# Patient Record
Sex: MALE | Race: WHITE
[De-identification: names, ages, dates, MRNs, and addresses within clinical notes are randomized per-mention and may not be internally consistent; named-entity substitution may affect disease eponyms.]

---
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
## 2019-02-02 ENCOUNTER — HOSPITAL ENCOUNTER (EMERGENCY)
Dept: HOSPITAL 62 - ER | Age: 19
Discharge: HOME | End: 2019-02-02
Payer: SELF-PAY

## 2019-02-02 VITALS — DIASTOLIC BLOOD PRESSURE: 53 MMHG | SYSTOLIC BLOOD PRESSURE: 131 MMHG

## 2019-02-02 DIAGNOSIS — Z88.1: ICD-10-CM

## 2019-02-02 DIAGNOSIS — M79.10: ICD-10-CM

## 2019-02-02 DIAGNOSIS — R00.0: ICD-10-CM

## 2019-02-02 DIAGNOSIS — J06.9: ICD-10-CM

## 2019-02-02 DIAGNOSIS — B34.9: Primary | ICD-10-CM

## 2019-02-02 DIAGNOSIS — R06.02: ICD-10-CM

## 2019-02-02 DIAGNOSIS — Z88.0: ICD-10-CM

## 2019-02-02 DIAGNOSIS — M25.519: ICD-10-CM

## 2019-02-02 DIAGNOSIS — M54.9: ICD-10-CM

## 2019-02-02 LAB
ADD MANUAL DIFF: NO
ALBUMIN SERPL-MCNC: 4.4 G/DL (ref 3.7–5.6)
ALP SERPL-CCNC: 47 U/L (ref 65–260)
ALT SERPL-CCNC: 27 U/L (ref 10–40)
ANION GAP SERPL CALC-SCNC: 11 MMOL/L (ref 5–19)
APPEARANCE UR: CLEAR
APTT PPP: (no result) S
AST SERPL-CCNC: 27 U/L (ref 10–45)
BASOPHILS # BLD AUTO: 0 10^3/UL (ref 0–0.2)
BASOPHILS NFR BLD AUTO: 0.3 % (ref 0–2)
BILIRUB DIRECT SERPL-MCNC: 0.2 MG/DL (ref 0–0.4)
BILIRUB SERPL-MCNC: 0.5 MG/DL (ref 0.2–1.3)
BILIRUB UR QL STRIP: NEGATIVE
BUN SERPL-MCNC: 13 MG/DL (ref 7–20)
CALCIUM: 9.3 MG/DL (ref 8.4–10.2)
CHLORIDE SERPL-SCNC: 100 MMOL/L (ref 98–107)
CK SERPL-CCNC: 109 U/L (ref 55–170)
CO2 SERPL-SCNC: 29 MMOL/L (ref 22–30)
EOSINOPHIL # BLD AUTO: 0 10^3/UL (ref 0–0.6)
EOSINOPHIL NFR BLD AUTO: 0.1 % (ref 0–6)
ERYTHROCYTE [DISTWIDTH] IN BLOOD BY AUTOMATED COUNT: 13.4 % (ref 11.5–14)
GLUCOSE SERPL-MCNC: 104 MG/DL (ref 75–110)
GLUCOSE UR STRIP-MCNC: NEGATIVE MG/DL
HCT VFR BLD CALC: 41 % (ref 37.9–51)
HGB BLD-MCNC: 14.3 G/DL (ref 13.5–17)
KETONES UR STRIP-MCNC: NEGATIVE MG/DL
LYMPHOCYTES # BLD AUTO: 0.6 10^3/UL (ref 0.5–4.7)
LYMPHOCYTES NFR BLD AUTO: 7.2 % (ref 13–45)
MCH RBC QN AUTO: 28.6 PG (ref 27–33.4)
MCHC RBC AUTO-ENTMCNC: 34.9 G/DL (ref 32–36)
MCV RBC AUTO: 82 FL (ref 80–97)
MONOCYTES # BLD AUTO: 0.4 10^3/UL (ref 0.1–1.4)
MONOCYTES NFR BLD AUTO: 4.5 % (ref 3–13)
NEUTROPHILS # BLD AUTO: 7.5 10^3/UL (ref 1.7–8.2)
NEUTS SEG NFR BLD AUTO: 87.9 % (ref 42–78)
NITRITE UR QL STRIP: NEGATIVE
PH UR STRIP: 8 [PH] (ref 5–9)
PLATELET # BLD: 166 10^3/UL (ref 150–450)
POTASSIUM SERPL-SCNC: 4.1 MMOL/L (ref 3.6–5)
PROT SERPL-MCNC: 6.7 G/DL (ref 6.3–8.2)
PROT UR STRIP-MCNC: NEGATIVE MG/DL
RBC # BLD AUTO: 5.01 10^6/UL (ref 4.35–5.55)
SODIUM SERPL-SCNC: 139.8 MMOL/L (ref 137–145)
SP GR UR STRIP: 1.01
TOTAL CELLS COUNTED % (AUTO): 100 %
UROBILINOGEN UR-MCNC: NEGATIVE MG/DL (ref ?–2)
WBC # BLD AUTO: 8.5 10^3/UL (ref 4–10.5)

## 2019-02-02 PROCEDURE — 82550 ASSAY OF CK (CPK): CPT

## 2019-02-02 PROCEDURE — 85025 COMPLETE CBC W/AUTO DIFF WBC: CPT

## 2019-02-02 PROCEDURE — 71046 X-RAY EXAM CHEST 2 VIEWS: CPT

## 2019-02-02 PROCEDURE — 81001 URINALYSIS AUTO W/SCOPE: CPT

## 2019-02-02 PROCEDURE — 96375 TX/PRO/DX INJ NEW DRUG ADDON: CPT

## 2019-02-02 PROCEDURE — 96374 THER/PROPH/DIAG INJ IV PUSH: CPT

## 2019-02-02 PROCEDURE — 80053 COMPREHEN METABOLIC PANEL: CPT

## 2019-02-02 PROCEDURE — 36415 COLL VENOUS BLD VENIPUNCTURE: CPT

## 2019-02-02 PROCEDURE — 99284 EMERGENCY DEPT VISIT MOD MDM: CPT

## 2019-02-02 PROCEDURE — 96361 HYDRATE IV INFUSION ADD-ON: CPT

## 2019-02-02 PROCEDURE — 82375 ASSAY CARBOXYHB QUANT: CPT

## 2019-02-02 NOTE — ER DOCUMENT REPORT
Entered by LURDES ESPINOZA SCRIBE  02/02/19 0748 





Acting as scribe for:KAYLEE KEY MD





ED General





- General


Chief Complaint: Flu Symptoms


Stated Complaint: FEVER


Time Seen by Provider: 02/02/19 07:24


Mode of Arrival: Ambulatory


Information source: Patient


Notes: 


Patient is a 18 year old male with no significant medical history presents to 

the emergency department complaining of shoulder and back pain onset 4 days ago.

He states 4 days ago, he woke up with minimal upper back pain and attributed it 

to his scoliosis. He states he proceeded to take his dogs for a walk where they 

"pulled" him and he immediately had severe back and shoulder pain that caused 

him to have shortness of breath. He states he also had some nausea and proceeded

to go to sleep most of the day. He states his back pain and shoulder pain 

persisted until yesterday when the symptoms completely resolved. He states this 

morning his back pain returned worse than before. He states the pain is 

exacerbated with coughing and deep breathing further stating if he coughs, it 

will cause pain then subsequent shortness of breath. He also complains of a 

temperature of 101-102 over the last four days. He denies any nausea for the 

last 3 days. 





Patient states he is allergic to Rocephin.





TRAVEL OUTSIDE OF THE [place] IN LAST 30 DAYS: No





- Related Data


Allergies/Adverse Reactions: 


                                        





amoxicillin [From Augmentin] Allergy (Verified 02/02/19 07:53)


   


ceftriaxone [From Rocephin] Allergy (Verified 02/02/19 07:53)


   


clavulanic acid [From Augmentin] Allergy (Verified 02/02/19 07:53)


   











Past Medical History





- General


Information source: Patient





- Social History


Smoking Status: Never Smoker


Cigarette use (# per day): No


Chew tobacco use (# tins/day): No


Smoking Education Provided: No


Frequency of alcohol use: None


Family History: Reviewed & Not Pertinent


Musculoskeletal Medical History: Reports Other - Scoliosis


Past Surgical History: Reports: Hx Tonsillectomy





Review of Systems





- Review of Systems


Constitutional: No symptoms reported


EENT: No symptoms reported


Cardiovascular: No symptoms reported


Respiratory: See HPI, Short of breath


Gastrointestinal: See HPI, Nausea


Genitourinary: No symptoms reported


Male Genitourinary: No symptoms reported


Musculoskeletal: See HPI, Back pain


Skin: No symptoms reported


Hematologic/Lymphatic: No symptoms reported


Neurological/Psychological: No symptoms reported


-: Yes All other systems reviewed and negative





Physical Exam





- Vital signs


Vitals: 


                                        











Temp Pulse Resp BP Pulse Ox


 


 98.5 F   125 H  16   132/71 H  98 


 


 02/02/19 06:26  02/02/19 06:26  02/02/19 06:26  02/02/19 06:26  02/02/19 06:26














- Notes


Notes: 


GENERAL: Alert, interacts well. No acute distress.


HEAD: Normocephalic, atraumatic.


EYES: Pupils equal, round, and reactive to light. Extraocular movements intact.


ENT: Oral mucosa dry, tongue midline. Nares patent, no nasal septal hematoma, TM

's intacts. Posterior orophraynx clear. 


NECK: Full range of motion. Supple. Trachea midline.


LUNGS: Coarse breath sounds with cough. No respiratory distress.


HEART: Tachycardic. No murmurs, gallops, or rubs.


ABDOMEN: Soft, non-tender. Non-distended. Bowel sounds present in all 4 

quadrants.


EXTREMITIES: Moves all 4 extremities spontaneously. No edema, radial pulses 2/4 

bilaterally. 


NEUROLOGICAL: Alert and oriented x3. Normal speech. 


PSYCH: Normal affect, normal mood.


SKIN: Quite warm to the touch, dry, normal turgor. No rashes or lesions noted.


BACK: Scapula and trapezius muscles tender to palpation bilaterally. 











Course





- Vital Signs


Vital signs: 


                                        











Temp Pulse Resp BP Pulse Ox


 


 99.4 F   125 H  12   112/74   99 


 


 02/02/19 11:15  02/02/19 06:26  02/02/19 11:15  02/02/19 11:14  02/02/19 11:15














- Laboratory


Result Diagrams: 


                                 02/02/19 08:00





                                 02/02/19 08:00


Laboratory results interpreted by me: 


                                        











  02/02/19 02/02/19





  08:00 08:00


 


Seg Neutrophils %  87.9 H 


 


Lymphocytes %  7.2 L 


 


Alkaline Phosphatase   47 L














- Diagnostic Test


Radiology reviewed: Image reviewed, Reports reviewed - Chest x-ray does not show

acute abnormality





Discharge





- Discharge


Clinical Impression: 


 Viral syndrome, Myalgia





Upper respiratory tract infection


Qualifiers:


 URI type: unspecified URI Qualified Code(s): J06.9 - Acute upper respiratory 

infection, unspecified





Condition: Stable


Disposition: HOME, SELF-CARE


Additional Instructions: 


Viral Syndrome





     The physician has diagnosed a viral infection.  Viruses not only cause 

"colds," but can cause many different symptoms including generalized aching, 

fever, headache, cough, diarrhea, nausea, vomiting, and fatigue.


     The treatment, for the most part, is simply relief of symptoms. This means 

that antibiotics are usually not given.  Rest, fluids, pain medications and, 

occasionally, medication for the specific symptoms that are most bothersome will

be prescribed. Use good handwashing to avoid passing the virus to others. Shared

toys should be cleaned with disinfectant. Clean the toilets, sinks, and counter 

surfaces in bathrooms. Launder clothing in hot water.


     Contact the physician if you develop any new or unusual symptoms such as 

severe headache, stiff neck, high fever, chest pain, productive cough, or 

shortness of breath.  You should be rechecked if you don't see marked 

improvement within seven to 10 days.








***************

****************************************************************************************************

***





You probably strained your upper back muscles about the same time you are 

developing this viral syndrome, thereby making that area hurt more than it 

otherwise would have.


Be sure to drink plenty of fluids and get plenty of rest and sleep.


Limit any activities that make those painful muscular areas hurt worse.


Take ibuprofen 800 mg every 8 hours.  You may also take Tylenol for additional 

pain relief.


Take Robitussin-DM to help suppress cough if you find that if needed.





Follow-up with a local medical doctor if not improving over the next several 

days.





RETURN TO THE EMERGENCY ROOM IF ANY NEW OR WORSENING SYMPTOMS.











I personally performed the services described in the documentation, reviewed and

edited the documentation which was dictated to the scribe in my presence, and it

accurately records my words and actions.

## 2019-02-02 NOTE — RADIOLOGY REPORT (SQ)
EXAM DESCRIPTION:  CHEST 2 VIEWS



COMPLETED DATE/TIME:  2/2/2019 1:00 pm



REASON FOR STUDY:  Cough, fatigue, scapular back pain



COMPARISON:  None.



EXAM PARAMETERS:  NUMBER OF VIEWS: two views

TECHNIQUE: Digital Frontal and Lateral radiographic views of the chest acquired.

RADIATION DOSE: NA

LIMITATIONS: none



FINDINGS:  LUNGS AND PLEURA: No opacities, masses or pneumothorax. No pleural effusion.

MEDIASTINUM AND HILAR STRUCTURES: No masses or contour abnormalities.

HEART AND VASCULAR STRUCTURES: Heart normal size.  No evidence for failure.

BONES: No acute findings.

HARDWARE: None in the chest.

OTHER: No other significant finding.



IMPRESSION:  NO ACUTE RADIOGRAPHIC FINDING IN THE CHEST.



TECHNICAL DOCUMENTATION:  JOB ID:  [id number]

 2011 [employer]- All Rights Reserved



Reading location - IP/workstation name: [email or web address]